# Patient Record
Sex: MALE | Race: OTHER | HISPANIC OR LATINO | Employment: FULL TIME | URBAN - METROPOLITAN AREA
[De-identification: names, ages, dates, MRNs, and addresses within clinical notes are randomized per-mention and may not be internally consistent; named-entity substitution may affect disease eponyms.]

---

## 2022-05-03 ENCOUNTER — OFFICE VISIT (OUTPATIENT)
Dept: URGENT CARE | Facility: CLINIC | Age: 42
End: 2022-05-03

## 2022-05-03 VITALS
SYSTOLIC BLOOD PRESSURE: 124 MMHG | OXYGEN SATURATION: 96 % | RESPIRATION RATE: 18 BRPM | DIASTOLIC BLOOD PRESSURE: 79 MMHG | HEIGHT: 70 IN | HEART RATE: 61 BPM | TEMPERATURE: 98 F | BODY MASS INDEX: 37.24 KG/M2 | WEIGHT: 260.13 LBS

## 2022-05-03 DIAGNOSIS — M54.40 ACUTE RIGHT-SIDED LOW BACK PAIN WITH SCIATICA, SCIATICA LATERALITY UNSPECIFIED: Primary | ICD-10-CM

## 2022-05-03 DIAGNOSIS — K59.00 CONSTIPATION, UNSPECIFIED CONSTIPATION TYPE: ICD-10-CM

## 2022-05-03 PROCEDURE — 99203 PR OFFICE/OUTPT VISIT, NEW, LEVL III, 30-44 MIN: ICD-10-PCS | Mod: 25,S$GLB,, | Performed by: NURSE PRACTITIONER

## 2022-05-03 PROCEDURE — 96372 PR INJECTION,THERAP/PROPH/DIAG2ST, IM OR SUBCUT: ICD-10-PCS | Mod: S$GLB,,, | Performed by: NURSE PRACTITIONER

## 2022-05-03 PROCEDURE — 96372 THER/PROPH/DIAG INJ SC/IM: CPT | Mod: S$GLB,,, | Performed by: NURSE PRACTITIONER

## 2022-05-03 PROCEDURE — 99203 OFFICE O/P NEW LOW 30 MIN: CPT | Mod: 25,S$GLB,, | Performed by: NURSE PRACTITIONER

## 2022-05-03 RX ORDER — METHOCARBAMOL 500 MG/1
500 TABLET, FILM COATED ORAL 3 TIMES DAILY PRN
Qty: 21 TABLET | Refills: 0 | Status: SHIPPED | OUTPATIENT
Start: 2022-05-03

## 2022-05-03 RX ORDER — KETOROLAC TROMETHAMINE 30 MG/ML
30 INJECTION, SOLUTION INTRAMUSCULAR; INTRAVENOUS
Status: COMPLETED | OUTPATIENT
Start: 2022-05-03 | End: 2022-05-03

## 2022-05-03 RX ADMIN — KETOROLAC TROMETHAMINE 30 MG: 30 INJECTION, SOLUTION INTRAMUSCULAR; INTRAVENOUS at 10:05

## 2022-05-03 NOTE — PATIENT INSTRUCTIONS
-You were given a shot of Toradol which is an NSAID.  Do not take any additional NSAIDs for the next 24 hours.    -Robaxin as prescribed. Do not drink alcohol, drive, work, or operate heavy machinery while taking this medication, it may cause drowsiness.       INSTRUCTIONS:  - Rest.  - Drink plenty of fluids.  - Take Tylenol and/or Ibuprofen as directed as needed for fever/pain.  Do not take more than the recommended dose.  - follow up with your PCP within the next 1-2 weeks as needed.  - You must understand that you have received an Urgent Care treatment only and that you may be released before all of your medical problems are known or treated.   - You, the patient, will arrange for follow up care as instructed.   - If your condition worsens or fails to improve we recommend that you receive another evaluation at the ER immediately or contact your PCP to discuss your concerns.   - You can call [phone] or [phone] to help schedule an appointment with the appropriate provider.

## 2022-05-03 NOTE — PROGRESS NOTES
"Subjective:       Patient ID: Brent Eden is a 41 y.o. male.    Vitals:  height is 5' 10" (1.778 m) and weight is 118 kg (260 lb 2.3 oz). His temperature is 97.7 °F (36.5 °C). His blood pressure is 124/79 and his pulse is 61. His respiration is 18 and oxygen saturation is 96%.     Chief Complaint: Back Pain    Pt sts he has been having lower back pn on and off for a couple months but has been worse the last 2 wks. Denies any injury or trauma. He sts he did have some nunbness and tingling in his hands a few weeks ago but none as of today. He sts sometimes he does have constipation that makes it worse. Pt has taken ibuprofen with no relief.     Provider note begins below:  41-year-old male here today with complaints of right lower back pain for the past few months.  Patient denies any type of injury however he does build scaffolding and works construction. Patient reports that the pain is worsened with sitting to standing and bending.  Patient reports that he took Advil at home with minimal relief.  Also, patient complains of constipation off and on.  Reports a normal bowel movement this morning.  Patient denies fever, chills or cough.  Patient denies extremity weakness/ numbness, loss of bowel or bladder, urinary symptoms including hematuria or dysuria, saddle anesthesia or abdominal pain.    Back Pain  This is a new problem. The current episode started more than 1 month ago. The problem occurs intermittently. The problem has been gradually worsening since onset. The quality of the pain is described as shooting. The pain is at a severity of 7/10. The symptoms are aggravated by standing, twisting and bending. Associated symptoms include headaches. Pertinent negatives include no abdominal pain, bladder incontinence, bowel incontinence, chest pain, dysuria, fever, leg pain, numbness, paresis, paresthesias, pelvic pain, perianal numbness, tingling, weakness or weight loss. He has tried NSAIDs for the symptoms. The " treatment provided no relief.       Constitution: Negative. Negative for chills, fatigue and fever.   HENT: Negative for ear pain, ear discharge, facial swelling and sinus pressure.    Neck: Negative for neck pain, neck stiffness and painful lymph nodes.   Cardiovascular: Negative.  Negative for chest pain and sob on exertion.   Eyes: Negative.  Negative for eye itching, eye pain and eye redness.   Respiratory: Negative.  Negative for chest tightness, cough, shortness of breath, wheezing and asthma.    Gastrointestinal: Negative.  Negative for abdominal pain, nausea, vomiting and bowel incontinence.   Endocrine: negative. excessive thirst.   Genitourinary: Negative.  Negative for dysuria, frequency, urgency, urine decreased, flank pain, bladder incontinence and pelvic pain.   Musculoskeletal: Positive for back pain. Negative for pain, trauma, joint pain and joint swelling.   Skin: Negative.  Negative for rash, wound, lesion and hives.   Allergic/Immunologic: Negative.  Negative for eczema, asthma, hives, itching and sneezing.   Neurological: Positive for headaches. Negative for dizziness, passing out, disorientation, altered mental status and numbness.   Hematologic/Lymphatic: Negative.  Negative for swollen lymph nodes.   Psychiatric/Behavioral: Negative.  Negative for altered mental status, disorientation and confusion.       Objective:      Physical Exam   Constitutional: He is oriented to person, place, and time. He appears well-developed. He is cooperative.  Non-toxic appearance. He does not appear ill. No distress.   HENT:   Head: Normocephalic and atraumatic.   Nose: Nose normal.   Mouth/Throat: Oropharynx is clear and moist and mucous membranes are normal.   Eyes: Conjunctivae and lids are normal.   Neck: Trachea normal and phonation normal. Neck supple. No neck rigidity present.   Cardiovascular: Normal rate, regular rhythm, normal heart sounds and normal pulses.   Pulmonary/Chest: Effort normal and breath  sounds normal. No stridor. No respiratory distress. He has no wheezes. He has no rhonchi. He has no rales. He exhibits no tenderness.   Abdominal: Normal appearance and bowel sounds are normal. He exhibits no abdominal bruit, no pulsatile midline mass and no mass. Soft. flat abdomenThere is no guarding, no left CVA tenderness and no right CVA tenderness.   Musculoskeletal:         General: No deformity.      Thoracic back: Normal.      Lumbar back: He exhibits tenderness.        Back:       Comments: Muscle strength 5/5.   Lymphadenopathy:     He has no cervical adenopathy.   Neurological: no focal deficit. He is alert, oriented to person, place, and time and at baseline. He has normal motor skills, normal sensation, normal strength, normal reflexes and intact cranial nerves. No sensory deficit. Gait and coordination normal. GCS eye subscore is 4. GCS verbal subscore is 5. GCS motor subscore is 6.   Skin: Skin is warm, dry, intact and not diaphoretic.   Psychiatric: His speech is normal and behavior is normal. Mood, judgment and thought content normal.   Nursing note and vitals reviewed.        Assessment:       1. Acute right-sided low back pain with sciatica, sciatica laterality unspecified    2. Constipation, unspecified constipation type          Plan:       FOLLOWUP  Follow up if symptoms worsen or fail to improve, for PLEASE CONTACT PCP OR CONTACT THE EMERGENCY ROOM..     PATIENT INSTRUCTIONS  Patient Instructions     -You were given a shot of Toradol which is an NSAID.  Do not take any additional NSAIDs for the next 24 hours.    -Robaxin as prescribed. Do not drink alcohol, drive, work, or operate heavy machinery while taking this medication, it may cause drowsiness.       INSTRUCTIONS:  - Rest.  - Drink plenty of fluids.  - Take Tylenol and/or Ibuprofen as directed as needed for fever/pain.  Do not take more than the recommended dose.  - follow up with your PCP within the next 1-2 weeks as needed.  - You  must understand that you have received an Urgent Care treatment only and that you may be released before all of your medical problems are known or treated.   - You, the patient, will arrange for follow up care as instructed.   - If your condition worsens or fails to improve we recommend that you receive another evaluation at the ER immediately or contact your PCP to discuss your concerns.   - You can call [phone] or [phone] to help schedule an appointment with the appropriate provider.              THANK YOU FOR ALLOWING ME TO PARTICIPATE IN YOUR HEALTHCARE,     Jerod D Bryce, NP   Acute right-sided low back pain with sciatica, sciatica laterality unspecified  -     ketorolac injection 30 mg  -     methocarbamoL (ROBAXIN) 500 MG Tab; Take 1 tablet (500 mg total) by mouth 3 (three) times daily as needed (back sapasms).  Dispense: 21 tablet; Refill: 0    Constipation, unspecified constipation type

## 2022-05-03 NOTE — LETTER
May 3, 2022      [facility]  [address] [facility], [place] [address]  Phone: [phone]  Fax: [phone]       Patient: Brent Eden   YOB: 1980  Date of Visit: 05/03/2022    To Whom It May Concern:    Debra Eden  was at [facility] on 05/03/2022. The patient may return to work/school on 05/05/2022 {With/no:90030} restrictions. If you have any questions or concerns, or if I can be of further assistance, please do not hesitate to contact me.    Sincerely,    Jerod Wu NP

## 2022-09-12 ENCOUNTER — HOSPITAL ENCOUNTER (OUTPATIENT)
Dept: RADIOLOGY | Facility: HOSPITAL | Age: 42
Discharge: HOME | End: 2022-09-12
Attending: NURSE PRACTITIONER
Payer: COMMERCIAL

## 2022-09-12 ENCOUNTER — OFFICE VISIT (OUTPATIENT)
Dept: URGENT CARE | Facility: CLINIC | Age: 42
End: 2022-09-12
Payer: COMMERCIAL

## 2022-09-12 VITALS
BODY MASS INDEX: 37.22 KG/M2 | HEART RATE: 63 BPM | HEIGHT: 70 IN | TEMPERATURE: 98 F | OXYGEN SATURATION: 98 % | WEIGHT: 260 LBS | SYSTOLIC BLOOD PRESSURE: 141 MMHG | RESPIRATION RATE: 18 BRPM | DIASTOLIC BLOOD PRESSURE: 88 MMHG

## 2022-09-12 DIAGNOSIS — N50.811 TESTICULAR PAIN, RIGHT: Primary | ICD-10-CM

## 2022-09-12 DIAGNOSIS — N50.811 TESTICULAR PAIN, RIGHT: ICD-10-CM

## 2022-09-12 LAB
BILIRUB UR QL STRIP: NEGATIVE
GLUCOSE UR QL STRIP: NEGATIVE
KETONES UR QL STRIP: NEGATIVE
LEUKOCYTE ESTERASE UR QL STRIP: NEGATIVE
PH, POC UA: 5
POC BLOOD, URINE: NEGATIVE
POC NITRATES, URINE: NEGATIVE
PROT UR QL STRIP: NEGATIVE
SP GR UR STRIP: 1.02 (ref 1–1.03)
UROBILINOGEN UR STRIP-ACNC: NORMAL (ref 0.3–2.2)

## 2022-09-12 PROCEDURE — 99213 PR OFFICE/OUTPT VISIT, EST, LEVL III, 20-29 MIN: ICD-10-PCS | Mod: S$GLB,,, | Performed by: NURSE PRACTITIONER

## 2022-09-12 PROCEDURE — 81003 POCT URINALYSIS, DIPSTICK, AUTOMATED, W/O SCOPE: ICD-10-PCS | Mod: QW,S$GLB,, | Performed by: NURSE PRACTITIONER

## 2022-09-12 PROCEDURE — 87491 CHLMYD TRACH DNA AMP PROBE: CPT | Performed by: NURSE PRACTITIONER

## 2022-09-12 PROCEDURE — 1160F PR REVIEW ALL MEDS BY PRESCRIBER/CLIN PHARMACIST DOCUMENTED: ICD-10-PCS | Mod: CPTII,S$GLB,, | Performed by: NURSE PRACTITIONER

## 2022-09-12 PROCEDURE — 81003 URINALYSIS AUTO W/O SCOPE: CPT | Mod: QW,S$GLB,, | Performed by: NURSE PRACTITIONER

## 2022-09-12 PROCEDURE — 76870 US EXAM SCROTUM: CPT | Mod: TC

## 2022-09-12 PROCEDURE — 3008F BODY MASS INDEX DOCD: CPT | Mod: CPTII,S$GLB,, | Performed by: NURSE PRACTITIONER

## 2022-09-12 PROCEDURE — 3008F PR BODY MASS INDEX (BMI) DOCUMENTED: ICD-10-PCS | Mod: CPTII,S$GLB,, | Performed by: NURSE PRACTITIONER

## 2022-09-12 PROCEDURE — 76870 US EXAM SCROTUM: CPT | Mod: 26,,, | Performed by: RADIOLOGY

## 2022-09-12 PROCEDURE — 3077F PR MOST RECENT SYSTOLIC BLOOD PRESSURE >= 140 MM HG: ICD-10-PCS | Mod: CPTII,S$GLB,, | Performed by: NURSE PRACTITIONER

## 2022-09-12 PROCEDURE — 3079F PR MOST RECENT DIASTOLIC BLOOD PRESSURE 80-89 MM HG: ICD-10-PCS | Mod: CPTII,S$GLB,, | Performed by: NURSE PRACTITIONER

## 2022-09-12 PROCEDURE — 3077F SYST BP >= 140 MM HG: CPT | Mod: CPTII,S$GLB,, | Performed by: NURSE PRACTITIONER

## 2022-09-12 PROCEDURE — 99213 OFFICE O/P EST LOW 20 MIN: CPT | Mod: S$GLB,,, | Performed by: NURSE PRACTITIONER

## 2022-09-12 PROCEDURE — 87591 N.GONORRHOEAE DNA AMP PROB: CPT | Performed by: NURSE PRACTITIONER

## 2022-09-12 PROCEDURE — 1159F PR MEDICATION LIST DOCUMENTED IN MEDICAL RECORD: ICD-10-PCS | Mod: CPTII,S$GLB,, | Performed by: NURSE PRACTITIONER

## 2022-09-12 PROCEDURE — 76870 US SCROTUM AND TESTICLES: ICD-10-PCS | Mod: 26,,, | Performed by: RADIOLOGY

## 2022-09-12 PROCEDURE — 3079F DIAST BP 80-89 MM HG: CPT | Mod: CPTII,S$GLB,, | Performed by: NURSE PRACTITIONER

## 2022-09-12 PROCEDURE — 1160F RVW MEDS BY RX/DR IN RCRD: CPT | Mod: CPTII,S$GLB,, | Performed by: NURSE PRACTITIONER

## 2022-09-12 PROCEDURE — 1159F MED LIST DOCD IN RCRD: CPT | Mod: CPTII,S$GLB,, | Performed by: NURSE PRACTITIONER

## 2022-09-12 NOTE — PROGRESS NOTES
"Subjective:       Patient ID: Brent Eden is a 41 y.o. male.    Vitals:  height is 5' 10" (1.778 m) and weight is 117.9 kg (260 lb). His oral temperature is 97.6 °F (36.4 °C). His blood pressure is 141/88 (abnormal) and his pulse is 63. His respiration is 18 and oxygen saturation is 98%.     Chief Complaint: Testicle Pain    41-year-old male presents to clinic for evaluation of right testicle pain x2 weeks.  Patient states that the pain has been intermittent in severity.  Denies any trauma.  He denies any urinary complaints.  Denies any concerns for STDs.  He denies fever.  He denies swelling. he is awake and alert, answers questions appropriately, no acute distress noted on today's visit.        Testicle Pain  The patient's primary symptoms include testicular pain. The patient's pertinent negatives include no penile discharge, penile pain or scrotal swelling. This is a new problem. Episode onset: 2 weeks ago. The problem occurs constantly. The problem has been waxing and waning. The pain is moderate. Pertinent negatives include no chest pain, chills or fever. The testicular pain affects both testicles. The color of the testicles is Normal. Nothing aggravates the symptoms. He has tried nothing for the symptoms.     Constitution: Negative for activity change, appetite change, chills, sweating, fatigue and fever.   HENT:  Negative for congestion.    Cardiovascular:  Negative for chest pain.   Genitourinary:  Positive for testicular pain. Negative for penile discharge, penile pain, penile swelling and scrotal swelling.   Neurological:  Negative for dizziness.     Objective:      Physical Exam   Constitutional: He is oriented to person, place, and time. He appears well-developed.  Non-toxic appearance. He does not appear ill. No distress.   HENT:   Head: Normocephalic and atraumatic.   Ears:   Right Ear: External ear normal.   Left Ear: External ear normal.   Nose: No nasal deformity. No epistaxis.   Mouth/Throat: " Oropharynx is clear and moist and mucous membranes are normal.   Eyes: Conjunctivae and lids are normal. Right eye exhibits no discharge. Left eye exhibits no discharge.   Neck: Trachea normal and phonation normal.   Cardiovascular: Normal rate.   Pulmonary/Chest: Effort normal. No respiratory distress.   Abdominal: Normal appearance and bowel sounds are normal. He exhibits no distension. Soft. There is no abdominal tenderness.   Genitourinary:    Penis normal.   Right testis shows tenderness. Right testis shows no mass and no swelling. Right testis is descended. Cremasteric reflex is not absent on the right side. Left testis shows no mass, no swelling and no tenderness. Left testis is descended. Cremasteric reflex is not absent on the left side.   Musculoskeletal: Normal range of motion.         General: Normal range of motion.   Neurological: He is alert and oriented to person, place, and time. He has normal reflexes.   Skin: Skin is warm, dry, intact and not diaphoretic.   Psychiatric: His speech is normal and behavior is normal. Mood, judgment and thought content normal.   Nursing note and vitals reviewed.chaperone present       Results for orders placed or performed in visit on 09/12/22   POCT Urinalysis, Dipstick, Automated, W/O Scope   Result Value Ref Range    POC Blood, Urine Negative Negative    POC Bilirubin, Urine Negative Negative    POC Urobilinogen, Urine normal 0.3 - 2.2    POC Ketones, Urine Negative Negative    POC Protein, Urine Negative Negative    POC Nitrates, Urine Negative Negative    POC Glucose, Urine Negative Negative    pH, UA 5.0     POC Specific Gravity, Urine 1.025 1.003 - 1.029    POC Leukocytes, Urine Negative Negative       Assessment:       1. Testicular pain, right          Plan:         Testicular pain, right  -     POCT Urinalysis, Dipstick, Automated, W/O Scope  -     C. trachomatis/N. gonorrhoeae by AMP DNA [facility]; Urine  -     US Scrotum And Testicles; Future; Expected date:  09/12/2022       - normal urinalysis on today's visit.  Ultrasound scheduled today at 3:00 p.m..  Given symptoms have been present for 2 weeks, less concerned for torsion.  Discussed alternating Tylenol/Motrin.  Patient has appointment scheduled this Friday, will call with results of ultrasound, discussed keeping the appointment and following up.  Patient verbalized understanding and is in agreement with plan.      Patient Instructions   - You must understand that you have received an Urgent Care treatment only and that you may be released before all of your medical problems are known or treated.   - You, the patient, will arrange for follow up care as instructed.   - If your condition worsens or fails to improve we recommend that you receive another evaluation at the ER immediately or contact your PCP to discuss your concerns or return here.     US Scrotum And Testicles    Result Date: 9/12/2022  EXAMINATION: US SCROTUM AND TESTICLES CLINICAL HISTORY: Right testicular pain TECHNIQUE: Sonography of the scrotum and testes. COMPARISON: None. FINDINGS: Right Testicle: *Size: 4.1 x 2.5 x 3.1 cm *Appearance: Normal. *Flow: Normal arterial and venous flow *Epididymis: Normal. *Hydrocele: None. *Varicocele: None. . Left Testicle: *Size: 3.7 x 2.6 x 2.3 cm *Appearance: Normal. *Flow: Normal arterial and venous flow *Epididymis: Normal. *Hydrocele: None. *Varicocele: None. . Other findings: None.     No significant abnormality. Electronically signed by: Radu Arnold Jr Date:    09/12/2022 Time:    15:59     Called to discuss ultrasound results, no acute findings.  Continue with follow-up with PCP this Friday.  Continue to alternate Tylenol/Motrin for pain relief.  Encourage fluids.  Patient verbalized understanding.

## 2022-09-12 NOTE — LETTER
September 12, 2022      [facility]  [address] [facility], [address]  [place] [address]  Phone: [phone]  Fax: [phone]       Patient: Brent Eden   YOB: 1980  Date of Visit: 09/12/2022    To Whom It May Concern:    Debra Eden  was at [facility] on 09/12/2022. The patient may return to work/school on 9/13/2022. If you have any questions or concerns, or if I can be of further assistance, please do not hesitate to contact me.    Sincerely,    Guerrero Massey NP

## 2022-09-15 ENCOUNTER — TELEPHONE (OUTPATIENT)
Dept: URGENT CARE | Facility: CLINIC | Age: 42
End: 2022-09-15
Payer: COMMERCIAL

## 2022-09-15 LAB
C TRACH DNA SPEC QL NAA+PROBE: NOT DETECTED
N GONORRHOEA DNA SPEC QL NAA+PROBE: NOT DETECTED

## 2022-09-18 ENCOUNTER — TELEPHONE (OUTPATIENT)
Dept: URGENT CARE | Facility: CLINIC | Age: 42
End: 2022-09-18
Payer: COMMERCIAL

## 2022-09-18 NOTE — TELEPHONE ENCOUNTER
Called patient to discuss negative results.  Patient has followed up with PCP.  All questions addressed on this telephone encounter.

## 2023-01-27 ENCOUNTER — OCCUPATIONAL HEALTH (OUTPATIENT)
Dept: URGENT CARE | Facility: CLINIC | Age: 43
End: 2023-01-27

## 2023-01-27 DIAGNOSIS — Z02.1 PRE-EMPLOYMENT EXAMINATION: Primary | ICD-10-CM

## 2023-01-27 PROCEDURE — 94010 BREATHING CAPACITY TEST: CPT | Mod: S$GLB,,, | Performed by: STUDENT IN AN ORGANIZED HEALTH CARE EDUCATION/TRAINING PROGRAM

## 2023-01-27 PROCEDURE — 99002 MASK FIT-QUANTITATIVE: ICD-10-PCS | Mod: S$GLB,,, | Performed by: STUDENT IN AN ORGANIZED HEALTH CARE EDUCATION/TRAINING PROGRAM

## 2023-01-27 PROCEDURE — 99080 OSHA QUESTIONNAIRE: ICD-10-PCS | Mod: S$GLB,,, | Performed by: STUDENT IN AN ORGANIZED HEALTH CARE EDUCATION/TRAINING PROGRAM

## 2023-01-27 PROCEDURE — 99499 PHYSICAL, BASIC COMPLEXITY: ICD-10-PCS | Mod: S$GLB,,, | Performed by: STUDENT IN AN ORGANIZED HEALTH CARE EDUCATION/TRAINING PROGRAM

## 2023-01-27 PROCEDURE — 94010 PULMONARY FUNCTION SCREENING (OCC MED PHYSICALS): ICD-10-PCS | Mod: S$GLB,,, | Performed by: STUDENT IN AN ORGANIZED HEALTH CARE EDUCATION/TRAINING PROGRAM

## 2023-01-27 PROCEDURE — 99499 UNLISTED E&M SERVICE: CPT | Mod: S$GLB,,, | Performed by: STUDENT IN AN ORGANIZED HEALTH CARE EDUCATION/TRAINING PROGRAM

## 2023-01-27 PROCEDURE — 99080 SPECIAL REPORTS OR FORMS: CPT | Mod: S$GLB,,, | Performed by: STUDENT IN AN ORGANIZED HEALTH CARE EDUCATION/TRAINING PROGRAM

## 2023-01-27 PROCEDURE — 99002 DEVICE HANDLING PHYS/QHP: CPT | Mod: S$GLB,,, | Performed by: STUDENT IN AN ORGANIZED HEALTH CARE EDUCATION/TRAINING PROGRAM

## 2023-01-27 NOTE — PROGRESS NOTES
C    I discussed the patient's results with him including his borderline PFT results.  He answered yes to some of the shortness of breath questions were his questionnaire in exhibit some signs of shortness of breath with exertion.  This appears to be due to overall deconditioning and I any organic pulmonary disease.  I recommended he follow-up with his.  I discussed the case with Leatha Atwood, .  Patient forms completed and he was cleared for full participation and use of respirator,